# Patient Record
(demographics unavailable — no encounter records)

---

## 2024-10-14 NOTE — HISTORY OF PRESENT ILLNESS
[FreeTextEntry1] : Initial hx 10/2021 5/15/2021 - 2nd dose of vaccine Mid 6/2021 - numbness/tingling in legs, attributed to BP meds. 8/9/2021 - LLE limp, progressed over days. saw neurologist, went to ED at [facility]. by 8/19, paraplegia and loss of BB function. Got IVMP, IVIG, then 7d PLEX because was worsening. Then another round of IVMP. Got 1g rituximab in 9/1/21. Has been in [place] for 1m in 9/2021. Got PE, DVT, sepsis. Went on eliquis. Saw hematologist, seeing heme again on 10/14. Got home last week. Got 1g rituximab in 9/1/21. Still no movement in both legs - some twitching and electrical sensations. Saw rheum, tested "positive for lupus". Got another 1g of rituximab in 11/23/2021 Rituximab dose May/2022 and 11/2022. 10/2023 - spiking fevers, found to have covid and then a UTI. went to ED and was not admitted. 2wks later, felt normal.    Subj interval:  Thinking about moving to [facility] next year.  Having issues obtaining catheters; needs more documentation.   Has been working out his upper body.  Still with intermittent body tightness, still with spasms that can cause pain 1x/wk, currently takes baclofen 30tid and an extra 30mg prn at night which has been helpful. Trileptal 150 bid didn't help at all.  Occ painful paresthesias, but not common. Stopped gabapentin 300bid and didn't notice a difference.  Continues PT twice/week which he thinks is helps.  Bowel is "pretty good", occ accidents.  Urinary leakage is occasional. Mostly straight cathing 4-6x/day  Sleep is a bit better but some nights interrupted by spasms or being sweaty. Marijuana gummies that he makes himself help with sleep.   Mood fluctuates, occasional fatigue throughout the day. Seeing a therapist.    PMHX: - HTN - MS - SLE? 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 Recommended by Dr. Steele to follow up at [facility] to retest and discuss folic acid.  MEDS: eliquis rituximab miralax senna tylenol prn baclofen 30qid grows his own marijuana which helps. solifenacin - still with leakage but better than with oxybutinin.  SHx: used to smoke until hospitalization, occ etoh, no drugs. was an .   O:  AO3. Normally conversant. Follows commands, names, and repeats. Good attention.  PERRL, no APD, no papilledema or pallor, VFF, EOMI, no nystagmus, face symmetric, TUP at midline.  Motor: R: L: Del 5 5 Bi 5 5 Tri 5 5 Wrist Extensors 5 5 Finger abductors 5 5  5 5  no movement in legs  Tone R L UE 0 0 LE 0 0 tone in both legs flaccid  Sensory mod to sev VBS loss absent PP, temp.  Reflexes: hyporeflexic in both legs, normal in UEs.  Coordination: R L FTN 0 0 TELLY 0 0  Other  Gait: wheelchair  Assistance: wheelchair   ESR 88, 106 VIVIANE 1:640 dsdna neg anca neg aqp4 neg mog neg  CSF 8/2021 WBC 23, lymph predom protein 121 OCBs + igg synth very high, igg index high quant gold neg aqp4 csf neg   MRI brain 8/2021 - several brain lesions, PV, typical appearance for MS  MRI C+T spine 8/2021, 9/2021, 10/2021 - myelitis w/ patchy enhancement, initially thoracic, progressed longitudinally to involve low cervical to low thoracic cord, transverse.  MRI brain, C, and T 1/2022 - a few lesions on brain MRI that have an appearance typical of MS. cord notable for development of dorsal column signal change rostral to thoracic lesion, and corticospinal t2h caudal to lesion, consistent with wallerian degeneration. There is no abnormal enhancement.  MRI brain, C, and T 9/2024 - stable lesions on my review compared with 2022 in [place].   AP: 37yo w/ myelitis in 8/2021. Brain lesions and +OCBs certainly increase suspicion for MS, which the patient likely has. However, the extent and severity of the myelitis was highly atypical for MS, initially s/p 2 rounds of IVMP, PLEX, and 1g of rituximab. continues on rituximab.  all questions answered, education provided, management discussed at length.  - cont rituximab 1g q6m x1 dose each (at specialty infusion). HE IS ON INDEFINIE RITUXIMAB TREATMENT.  - check blood work q6m - cont outpatient PT - cont baclofen to 30-30-30 as tolerated for spasms and an extra 30mg prn - he takes that sometimes at night if he is up. - trial of pregabalin (for auth: failed gabapentin, chronic neuropathic pain due to spinal cord injury) - pt prefers to use recreational marijuana instead of medical - f/u with hematologist given PE, on eliquis - cont urology f/u (Dr. Steele) - cont exercises - cont therapy - RTC 6m

## 2024-11-27 NOTE — ASSESSMENT
[FreeTextEntry1] : Mr. Viramontes is a 36 yo WM who presented numbness and then weakness in Lower extremities in July 2021. he was sent to the ER and admitted to [facility], and work up revealed Multiple Sclerosis. While in the hospital he had UTI, He received one dose of Rituxan, He was eventually discharged to [facility], a few days after being admitted to Rehab he developed right sided pleuritic chest pain and swelling, and was found to have a RLL segmental and subsegmental PE and RLE DVT. He was started on Eliquis. 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 Rest of work up, including Anticardiolipin antibodies, B 2 glycoproteins Lupus anticoagulant, Protein C fibrinogen, homocysteine, Factor 5 leiden, Prothrombin gene mutation, were normal. Protein S 131% on 1/4/23.  Elevated H/H 16.7/ 52.3 on 5/8/23. 11/30/23 CBC: WBC 7.11, Hg 17.5, HCT 52.4, plt 265  6/11/24 JAK2, MPL, CALR mutations negative Fe 65, TIBC 331, Sat 20, Ferritin 120, B12 417, FA 13.0 WBC 5.01, HGB 17.0, HCT 51.4, , ANC 3.29  #Polycythemia -Pt smokes 0.5 ppd, sleep study was negative -Possibly related to smoking -CBC CMP ordered today  # H/o PE/ DVT Patient requested to come off Eliquis currently on 2.5 bid eliquis HE is paralyzed in Wheelchair and no sensation in legs, After discussion with patient, on risks benefits , pt is agreeable to be kept on Eliquis 2.5 bid for now Patient planning to move to [place] mid 2025, Discussed establishing care with Providers in [place] when he gets there   F/U in 4-5 months w/ Joanna

## 2024-11-27 NOTE — ASSESSMENT
[FreeTextEntry1] : Mr. Viramontes is a 36 yo WM who presented numbness and then weakness in Lower extremities in July 2021. he was sent to the ER and admitted to [facility], and work up revealed Multiple Sclerosis. While in the hospital he had UTI, He received one dose of Rituxan, He was eventually discharged to [facility], a few days after being admitted to Rehab he developed right sided pleuritic chest pain and swelling, and was found to have a RLL segmental and subsegmental PE and RLE DVT. He was started on Eliquis. Work up done, while in the hospital revealed a protein S activity level of 48, D- Dimer of 449, Homozygote for MTHFR [id number]. Rest of work up, including Anticardiolipin antibodies, B 2 glycoproteins Lupus anticoagulant, Protein C fibrinogen, homocysteine, Factor 5 leiden, Prothrombin gene mutation, were normal. Protein S 131% on 1/4/23.  Elevated H/H 16.7/ 52.3 on 5/8/23. 11/30/23 CBC: WBC 7.11, Hg 17.5, HCT 52.4, plt 265  6/11/24 JAK2, MPL, CALR mutations negative Fe 65, TIBC 331, Sat 20, Ferritin 120, B12 417, FA 13.0 WBC 5.01, HGB 17.0, HCT 51.4, , ANC 3.29  #Polycythemia -Pt smokes 0.5 ppd, sleep study was negative -Possibly related to smoking -CBC CMP ordered today  # H/o PE/ DVT Patient requested to come off Eliquis currently on 2.5 bid eliquis HE is paralyzed in Wheelchair and no sensation in legs, After discussion with patient, on risks benefits , pt is agreeable to be kept on Eliquis 2.5 bid for now Patient planning to move to [place] mid 2025, Discussed establishing care with Providers in [place] when he gets there   F/U in 4-5 months w/ Joanna

## 2024-11-27 NOTE — PHYSICAL EXAM
[Capable of only limited self care, confined to bed or chair more than 50% of waking hours] : Status 3- Capable of only limited self care, confined to bed or chair more than 50% of waking hours [Normal] : affect appropriate [de-identified] : paralysis below waist

## 2024-11-27 NOTE — BEGINNING OF VISIT
[PHQ-2 Negative] : PHQ-2 Negative [PHQ-9 Deferred] : PHQ-9 Deferred [Advised Primary Care Follow-up] : Advised Primary Care Follow-up  [Current] : Current [Patient advised of risk of tobacco use and smoking cessation discussed.] : Patient advised of risk of tobacco use and smoking cessation discussed. [Date Discussed (MM/DD/YY): ___] : Discussed: [unfilled] [With Patient/Caregiver] : with Patient/Caregiver

## 2024-11-27 NOTE — HISTORY OF PRESENT ILLNESS
[de-identified] : \par  Mr. Viramontes is a 37 yo WM who presented numbness and then weakness in Lower extremities in July 2021. he was sent to the Er and admitted to [facility], and work up revealed Multiple Sclerosis. While in the hospital he had UTI, He received one dose of Rituxan, He was eventually discharged to [facility], a few days after being admitted to Rehab he developed right sided pleuritic chest pain and swelling, and was found to have a RLL segmental and subsegmental PE and RLE DVT. He was started on Eliquis. Work up done , while in the hospital revealed a protein S activity level of 48, , D- Dimer of 449, Homozygote for MTHFR [id number]. Rest of work up , including Anticardiolipin antibodies, B 2 glycoproteins Lupus anticoagulant, Protein C fibrinogen , homocysteine, Factor 5 leiden, Prothrombin gene mutation , were normal [de-identified] : Presents for follow up with spouse. Transferring care from Dr. Domínguez. Ambulates with wheelchair.   Continues on Eliquis 2.5 mg BID, tolerating well, no bleeding or bruising.  Continues on Rituxan 6 months, but no improvement in weakness secondary to MS. + intermittent snoring per spouse + moderate sleep, feels energized some of the time upon waking up Pending botox to bladder, following with uro, will pause PPX Eliquis 48 hrs before and after procedure  Was in PT, however stopped due to lack of coverage by insurance company.  Denies h/o sleep apnea 5/8/23 CBC: WBC 4.8 K, HGB 16.7 g, HCT 52.3 %,  K, ANC 2.6  12/6/23: Patient presents for follow up  Patient compliant with Eliquis 2.5 mg Patient had sleeping test performed, he does not have ARAM Denies bruising, epistaxis, hematuria  11/30/23 CBC: WBC 7.11, Hg 17.5, HCT 52.4, plt 265  6/5/24: Pt presents for follow up Pt reports no acute changes Currently receives Rituximab once every 6 months Currently still not able to get up, uses wheelchair Denies bleeding or bruising Denies being on testosterone, had sleep study recently Pt reports that he smokes around 0.5 ppd Used to donate blood regularly, around once per year before being diagnosed with MS Denies FMHx of hemochromatosis  11/27/24: Patient presents for follow up visit Continues of Eliquis 2.5 mg BID Follows with [facility] routinely, no acute changes, gets Rituximab, next at end of Dec 2024 Planning on moving mid 2025 to [place]  6/11/24 JAK2, MPL, CALR mutations negative Fe 65, TIBC 331, Sat 20, Ferritin 120, B12 417, FA 13.0 WBC 5.01, HGB 17.0, HCT 51.4, , ANC 3.29

## 2024-11-27 NOTE — PHYSICAL EXAM
[Capable of only limited self care, confined to bed or chair more than 50% of waking hours] : Status 3- Capable of only limited self care, confined to bed or chair more than 50% of waking hours [Normal] : affect appropriate [de-identified] : paralysis below waist

## 2024-11-27 NOTE — ADDENDUM
[FreeTextEntry1] :  Documented by Selma Ocampo acting as scribe for Dr. Cota on  11/27/2024.   All Medical record entries made by the Scribe were at my, Dr. Cota's, direction and personally dictated by me on  11/27/2024. I have reviewed the chart and agree that the record accurately reflects my personal performance of the history, physical exam, assessment and plan. I have also personally directed, reviewed, and agreed with the discharge instructions.

## 2025-01-13 NOTE — REVIEW OF SYSTEMS
[Joint Pain] : no joint pain [Negative] : Psychiatric [FreeTextEntry7] : Well established routine, no accidents, very rare small amount of blood.  [FreeTextEntry8] : see HPI. Last sono 3/24 [de-identified] : see HPI [de-identified] : see HPI [de-identified] : Evaluated for polycythemia with no findings including absence of sleep apnea. Felt most likely related to smoking.

## 2025-01-13 NOTE — PHYSICAL EXAM
[Normal] : Oriented to person, place, and time, insight and judgement were intact and the affect was normal [de-identified] : post to left greater troch, stage I pressure ulcer.  [de-identified] : Stable paraplegia with mild-mod increased tone in legs.

## 2025-01-13 NOTE — ASSESSMENT
[FreeTextEntry1] : 38 man with MS and resulting T6 paraplegia.  Rec: 1. UA, C&S. Sent script for Levaquin which he can begin if urinary symptoms worsen. 2. Renal and bladder sono. 3. Reeval of wc position by OT. Script sent and therapist notified.  4. Twice daily skin inspection, position changes when in commode.  5. Follow up  3ms.

## 2025-01-13 NOTE — HISTORY OF PRESENT ILLNESS
[FreeTextEntry1] : 38 man with T6 paraplegia from MS. Maintained on rituxan. Last MRIs did not show any progression of lesions. Clinically no deterioration either. Was switched from gabapentin to pregabalin by Dr. Mancuso and seems to be helping after first 10 days. Notes recurrence of UTIs characterized by voiding between caths and either blood in urine or cloudy urine. Last treated with antibiotic about a month ago. Now symptomatic again despite anticholinergic. No deterioration in functional status. Concerned about a new area of redness on skin.

## 2025-03-20 NOTE — PHYSICAL EXAM
[Wheelchair] : uses a wheelchair [de-identified] : CONSTITUTIONAL:  Patient is a very pleasant individual who is well-nourished and appears stated age.  PSYCHIATRIC:  Alert and oriented times three and in no apparent distress, and participates with orthopedic evaluation well. HEAD:  Atraumatic and  nonsyndromic in appearance. EENT: No thyromegaly, EOMI. RESPIRATORY:  Respiratory rate is regular, not dyspneic on examination. LYMPHATICS:  There is no cervical or axillary lymphadenopathy. INTEGUMENTARY:  Skin is clean, dry, and intact about the bilateral upper extremities and cervical spine.  Right mid axillary line may be slightly posterior in the LAT region there is a large subcuticular palpable mass mobile.  Small amount of erythema superficial and a small amount of skin breakdown is present. VASCULAR:   There is brisk capillary refill about the bilateral upper extremities and radial pulses are 2/4.  NEUROLOGIC:  Negative L'hirmitte, negative Spurling's sign. There are no pathologic reflexes. There is decrease in sensation of the bilateral upper extremities on manual examination.  Deep tendon reflexes are well-maintained at +0/4 of the bilateral upper extremities and are symmetric. MUSCULOSKELETAL:  There is visible muscular atrophy  lower extremities.  Manual motor strength is well maintained in the bilateral upper extremities.  Cervical range of motion is well maintained.  The patient ambulates in a non-myelopathic manner. Normal secondary orthopaedic exam of bilateral shoulders, elbows and hands.  Elbow flexion and extension, wrist extension, finger flexion and abduction are well maintained.    [de-identified] : CAT scan of the chest has been reviewed from [facility] is demonstrating this right chest wall subcuticular fluid collection

## 2025-03-20 NOTE — DISCUSSION/SUMMARY
[de-identified] : Very pleasant gentleman presents for this subcuticular mass evaluation of the right chest wall mid axillary LAT region.  He will be started on a broad-based simple antibiosis secondary to the superficial erythema MRI with and without contrast has been ordered to evaluate for subcuticular fluid collections air-fluid levels excetra.  Help this may help determine hematoma versus infection excetra.  Patient will follow-up immediately after MRI is complete.  Concerning presentation because of his underlying paraplegia and is insensate thoracic dermatome level this may be an underlying infection which is overall direct threat to bodily function hence MRI chest with and without contrast broad-spectrum antibiosis.  Follow-up immediately after MRI is complete

## 2025-03-20 NOTE — HISTORY OF PRESENT ILLNESS
[Worsening] : worsening [___ wks] : [unfilled] week(s) ago [0] : a maximum pain level of 0/10 [Constant] : ~He/She~ states the symptoms seem to be constant [Ataxia] : ataxia [de-identified] : Very pleasant gentleman with a unfortunate history of transverse myelitis and resulting thoracic paraplegia presents with a rather rapidly enlarging right mid axillary line may be slightly midaxillary line subcuticular chest mass.  He was in the ER on Friday he states that since that time it has been progressively getting bigger he denies any fevers chills excetra.  Referred here for follow-up. No complaints of pain may be misleading because of his underlying transverse myelitis and insensate thoracic dermatomes [Incontinence] : no incontinence [Loss of Dexterity] : good dexterity [Urinary Ret.] : no urinary retention

## 2025-04-01 NOTE — HISTORY OF PRESENT ILLNESS
[de-identified] : \par  Mr. Viramontes is a 35 yo WM who presented numbness and then weakness in Lower extremities in July 2021. he was sent to the Er and admitted to [facility], and work up revealed Multiple Sclerosis. While in the hospital he had UTI, He received one dose of Rituxan, He was eventually discharged to [facility], a few days after being admitted to Rehab he developed right sided pleuritic chest pain and swelling, and was found to have a RLL segmental and subsegmental PE and RLE DVT. He was started on Eliquis. Work up done , while in the hospital revealed a protein S activity level of 48, , D- Dimer of 449, Homozygote for MTHFR [id number]. Rest of work up , including Anticardiolipin antibodies, B 2 glycoproteins Lupus anticoagulant, Protein C fibrinogen , homocysteine, Factor 5 leiden, Prothrombin gene mutation , were normal [de-identified] : Presents for follow up with spouse. Transferring care from Dr. Domínguez. Ambulates with wheelchair.   Continues on Eliquis 2.5 mg BID, tolerating well, no bleeding or bruising.  Continues on Rituxan 6 months, but no improvement in weakness secondary to MS. + intermittent snoring per spouse + moderate sleep, feels energized some of the time upon waking up Pending botox to bladder, following with uro, will pause PPX Eliquis 48 hrs before and after procedure  Was in PT, however stopped due to lack of coverage by insurance company.  Denies h/o sleep apnea 5/8/23 CBC: WBC 4.8 K, HGB 16.7 g, HCT 52.3 %,  K, ANC 2.6  12/6/23: Patient presents for follow up  Patient compliant with Eliquis 2.5 mg Patient had sleeping test performed, he does not have ARAM Denies bruising, epistaxis, hematuria  11/30/23 CBC: WBC 7.11, Hg 17.5, HCT 52.4, plt 265  6/5/24: Pt presents for follow up Pt reports no acute changes Currently receives Rituximab once every 6 months Currently still not able to get up, uses wheelchair Denies bleeding or bruising Denies being on testosterone, had sleep study recently Pt reports that he smokes around 0.5 ppd Used to donate blood regularly, around once per year before being diagnosed with MS Denies FMHx of hemochromatosis  11/27/24: Patient presents for follow up visit Continues of Eliquis 2.5 mg BID Follows with [facility] routinely, no acute changes, gets Rituximab, next at end of Dec 2024 Planning on moving mid 2025 to [place]  6/11/24 JAK2, MPL, CALR mutations negative Fe 65, TIBC 331, Sat 20, Ferritin 120, B12 417, FA 13.0 WBC 5.01, HGB 17.0, HCT 51.4, , ANC 3.29  3/31/2025: Patient presents for follow up accompanied by wife Continues Eliquis 2.5 mg BID , compliant Patient seen in ED on 3/26/35 for hematoma on right flank. I&D performed, currently on Keflex They are concerned for possible redness around incision Denies purulent discharge and fever  Today's CBC: WBC 6.65, Hg 15.9 , HCT 48.1 , plt 279

## 2025-04-01 NOTE — PHYSICAL EXAM
[Capable of only limited self care, confined to bed or chair more than 50% of waking hours] : Status 3- Capable of only limited self care, confined to bed or chair more than 50% of waking hours [Normal] : affect appropriate [de-identified] : paralysis below waist

## 2025-04-01 NOTE — ASSESSMENT
[FreeTextEntry1] : Mr. Viramontes is a 36 yo WM who presented numbness and then weakness in Lower extremities in July 2021. he was sent to the ER and admitted to [facility], and work up revealed Multiple Sclerosis. While in the hospital he had UTI, He received one dose of Rituxan, He was eventually discharged to [facility], a few days after being admitted to Rehab he developed right sided pleuritic chest pain and swelling, and was found to have a RLL segmental and subsegmental PE and RLE DVT. He was started on Eliquis. Work up done, while in the hospital revealed a protein S activity level of 48, D- Dimer of 449, Homozygote for MTHFR [id number]. Rest of work up, including Anticardiolipin antibodies, B 2 glycoproteins Lupus anticoagulant, Protein C fibrinogen, homocysteine, Factor 5 leiden, Prothrombin gene mutation, were normal. Protein S 131% on 1/4/23.  Elevated H/H 16.7/ 52.3 on 5/8/23. 11/30/23 CBC: WBC 7.11, Hg 17.5, HCT 52.4, plt 265  6/11/24 JAK2, MPL, CALR mutations negative Fe 65, TIBC 331, Sat 20, Ferritin 120, B12 417, FA 13.0 WBC 5.01, HGB 17.0, HCT 51.4, , ANC 3.29  #Polycythemia -Pt smokes 0.5 ppd, sleep study was negative -Possibly related to smoking -CBC CMP ordered today  # H/o PE/ DVT Patient requested to come off Eliquis currently on 2.5 bid eliquis HE is paralyzed in Wheelchair and no sensation in legs, After discussion with patient, on risks benefits , pt is agreeable to be kept on Eliquis 2.5 bid for now Patient planning to move to [place] mid 2025, Discussed establishing care with Providers in [place] when he gets there   Advised to monitor incision for any redness and discharge. Will send additional 3 days of abx to avoid infection Patient to f/u with surgeon 4/3/25   F/U in 4-5 months

## 2025-04-01 NOTE — HISTORY OF PRESENT ILLNESS
[de-identified] : \par  Mr. Viramontes is a 35 yo WM who presented numbness and then weakness in Lower extremities in July 2021. he was sent to the Er and admitted to [facility], and work up revealed Multiple Sclerosis. While in the hospital he had UTI, He received one dose of Rituxan, He was eventually discharged to [facility], a few days after being admitted to Rehab he developed right sided pleuritic chest pain and swelling, and was found to have a RLL segmental and subsegmental PE and RLE DVT. He was started on Eliquis. 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 Rest of work up , including Anticardiolipin antibodies, B 2 glycoproteins Lupus anticoagulant, Protein C fibrinogen , homocysteine, Factor 5 leiden, Prothrombin gene mutation , were normal [de-identified] : Presents for follow up with spouse. Transferring care from Dr. Domínguez. Ambulates with wheelchair.   Continues on Eliquis 2.5 mg BID, tolerating well, no bleeding or bruising.  Continues on Rituxan 6 months, but no improvement in weakness secondary to MS. + intermittent snoring per spouse + moderate sleep, feels energized some of the time upon waking up Pending botox to bladder, following with uro, will pause PPX Eliquis 48 hrs before and after procedure  Was in PT, however stopped due to lack of coverage by insurance company.  Denies h/o sleep apnea 5/8/23 CBC: WBC 4.8 K, HGB 16.7 g, HCT 52.3 %,  K, ANC 2.6  12/6/23: Patient presents for follow up  Patient compliant with Eliquis 2.5 mg Patient had sleeping test performed, he does not have ARAM Denies bruising, epistaxis, hematuria  11/30/23 CBC: WBC 7.11, Hg 17.5, HCT 52.4, plt 265  6/5/24: Pt presents for follow up Pt reports no acute changes Currently receives Rituximab once every 6 months Currently still not able to get up, uses wheelchair Denies bleeding or bruising Denies being on testosterone, had sleep study recently Pt reports that he smokes around 0.5 ppd Used to donate blood regularly, around once per year before being diagnosed with MS Denies FMHx of hemochromatosis  11/27/24: Patient presents for follow up visit Continues of Eliquis 2.5 mg BID Follows with [facility] routinely, no acute changes, gets Rituximab, next at end of Dec 2024 Planning on moving mid 2025 to [place]  6/11/24 JAK2, MPL, CALR mutations negative Fe 65, TIBC 331, Sat 20, Ferritin 120, B12 417, FA 13.0 WBC 5.01, HGB 17.0, HCT 51.4, , ANC 3.29  3/31/2025: Patient presents for follow up accompanied by wife Continues Eliquis 2.5 mg BID , compliant Patient seen in ED on 3/26/35 for hematoma on right flank. I&D performed, currently on Keflex They are concerned for possible redness around incision Denies purulent discharge and fever  Today's CBC: WBC 6.65, Hg 15.9 , HCT 48.1 , plt 279

## 2025-04-01 NOTE — PHYSICAL EXAM
[Capable of only limited self care, confined to bed or chair more than 50% of waking hours] : Status 3- Capable of only limited self care, confined to bed or chair more than 50% of waking hours [Normal] : affect appropriate [de-identified] : paralysis below waist

## 2025-04-01 NOTE — PHYSICAL EXAM
[Capable of only limited self care, confined to bed or chair more than 50% of waking hours] : Status 3- Capable of only limited self care, confined to bed or chair more than 50% of waking hours [Normal] : affect appropriate [de-identified] : paralysis below waist

## 2025-04-01 NOTE — HISTORY OF PRESENT ILLNESS
[de-identified] : \par  Mr. Viramontes is a 37 yo WM who presented numbness and then weakness in Lower extremities in July 2021. he was sent to the Er and admitted to [facility], and work up revealed Multiple Sclerosis. While in the hospital he had UTI, He received one dose of Rituxan, He was eventually discharged to [facility], a few days after being admitted to Rehab he developed right sided pleuritic chest pain and swelling, and was found to have a RLL segmental and subsegmental PE and RLE DVT. He was started on Eliquis. Work up done , while in the hospital revealed a protein S activity level of 48, , D- Dimer of 449, Homozygote for MTHFR [id number]. Rest of work up , including Anticardiolipin antibodies, B 2 glycoproteins Lupus anticoagulant, Protein C fibrinogen , homocysteine, Factor 5 leiden, Prothrombin gene mutation , were normal [de-identified] : Presents for follow up with spouse. Transferring care from Dr. Domínguez. Ambulates with wheelchair.   Continues on Eliquis 2.5 mg BID, tolerating well, no bleeding or bruising.  Continues on Rituxan 6 months, but no improvement in weakness secondary to MS. + intermittent snoring per spouse + moderate sleep, feels energized some of the time upon waking up Pending botox to bladder, following with uro, will pause PPX Eliquis 48 hrs before and after procedure  Was in PT, however stopped due to lack of coverage by insurance company.  Denies h/o sleep apnea 5/8/23 CBC: WBC 4.8 K, HGB 16.7 g, HCT 52.3 %,  K, ANC 2.6  12/6/23: Patient presents for follow up  Patient compliant with Eliquis 2.5 mg Patient had sleeping test performed, he does not have ARAM Denies bruising, epistaxis, hematuria  11/30/23 CBC: WBC 7.11, Hg 17.5, HCT 52.4, plt 265  6/5/24: Pt presents for follow up Pt reports no acute changes Currently receives Rituximab once every 6 months Currently still not able to get up, uses wheelchair Denies bleeding or bruising Denies being on testosterone, had sleep study recently Pt reports that he smokes around 0.5 ppd Used to donate blood regularly, around once per year before being diagnosed with MS Denies FMHx of hemochromatosis  11/27/24: Patient presents for follow up visit Continues of Eliquis 2.5 mg BID Follows with [facility] routinely, no acute changes, gets Rituximab, next at end of Dec 2024 Planning on moving mid 2025 to [place]  6/11/24 JAK2, MPL, CALR mutations negative Fe 65, TIBC 331, Sat 20, Ferritin 120, B12 417, FA 13.0 WBC 5.01, HGB 17.0, HCT 51.4, , ANC 3.29  3/31/2025: Patient presents for follow up accompanied by wife Continues Eliquis 2.5 mg BID , compliant Patient seen in ED on 3/26/35 for hematoma on right flank. I&D performed, currently on Keflex They are concerned for possible redness around incision Denies purulent discharge and fever  Today's CBC: WBC 6.65, Hg 15.9 , HCT 48.1 , plt 279

## 2025-04-01 NOTE — ASSESSMENT
[FreeTextEntry1] : Mr. Viramontes is a 38 yo WM who presented numbness and then weakness in Lower extremities in July 2021. he was sent to the ER and admitted to [facility], and work up revealed Multiple Sclerosis. While in the hospital he had UTI, He received one dose of Rituxan, He was eventually discharged to [facility], a few days after being admitted to Rehab he developed right sided pleuritic chest pain and swelling, and was found to have a RLL segmental and subsegmental PE and RLE DVT. He was started on Eliquis. Work up done, while in the hospital revealed a protein S activity level of 48, D- Dimer of 449, Homozygote for MTHFR [id number]. Rest of work up, including Anticardiolipin antibodies, B 2 glycoproteins Lupus anticoagulant, Protein C fibrinogen, homocysteine, Factor 5 leiden, Prothrombin gene mutation, were normal. Protein S 131% on 1/4/23.  Elevated H/H 16.7/ 52.3 on 5/8/23. 11/30/23 CBC: WBC 7.11, Hg 17.5, HCT 52.4, plt 265  6/11/24 JAK2, MPL, CALR mutations negative Fe 65, TIBC 331, Sat 20, Ferritin 120, B12 417, FA 13.0 WBC 5.01, HGB 17.0, HCT 51.4, , ANC 3.29  #Polycythemia -Pt smokes 0.5 ppd, sleep study was negative -Possibly related to smoking -CBC CMP ordered today  # H/o PE/ DVT Patient requested to come off Eliquis currently on 2.5 bid eliquis HE is paralyzed in Wheelchair and no sensation in legs, After discussion with patient, on risks benefits , pt is agreeable to be kept on Eliquis 2.5 bid for now Patient planning to move to [place] mid 2025, Discussed establishing care with Providers in [place] when he gets there   Advised to monitor incision for any redness and discharge. Will send additional 3 days of abx to avoid infection Patient to f/u with surgeon 4/3/25   F/U in 4-5 months

## 2025-04-01 NOTE — ASSESSMENT
[FreeTextEntry1] : Mr. Viramontes is a 38 yo WM who presented numbness and then weakness in Lower extremities in July 2021. he was sent to the ER and admitted to [facility], and work up revealed Multiple Sclerosis. While in the hospital he had UTI, He received one dose of Rituxan, He was eventually discharged to [facility], a few days after being admitted to Rehab he developed right sided pleuritic chest pain and swelling, and was found to have a RLL segmental and subsegmental PE and RLE DVT. He was started on Eliquis. Work up done, while in the hospital revealed a protein S activity level of 48, D- Dimer of 449, Homozygote for MTHFR [id number]. Rest of work up, including Anticardiolipin antibodies, B 2 glycoproteins Lupus anticoagulant, Protein C fibrinogen, homocysteine, Factor 5 leiden, Prothrombin gene mutation, were normal. Protein S 131% on 1/4/23.  Elevated H/H 16.7/ 52.3 on 5/8/23. 11/30/23 CBC: WBC 7.11, Hg 17.5, HCT 52.4, plt 265  6/11/24 JAK2, MPL, CALR mutations negative Fe 65, TIBC 331, Sat 20, Ferritin 120, B12 417, FA 13.0 WBC 5.01, HGB 17.0, HCT 51.4, , ANC 3.29  #Polycythemia -Pt smokes 0.5 ppd, sleep study was negative -Possibly related to smoking -CBC CMP ordered today  # H/o PE/ DVT Patient requested to come off Eliquis currently on 2.5 bid eliquis HE is paralyzed in Wheelchair and no sensation in legs, After discussion with patient, on risks benefits , pt is agreeable to be kept on Eliquis 2.5 bid for now Patient planning to move to [place] mid 2025, Discussed establishing care with Providers in [place] when he gets there   Advised to monitor incision for any redness and discharge. Will send additional 3 days of abx to avoid infection Patient to f/u with surgeon 4/3/25   F/U in 4-5 months

## 2025-04-15 NOTE — HISTORY OF PRESENT ILLNESS
[FreeTextEntry1] : "Sixto" 38M w/ PMH MS (on rituximab) c/b T6 spastic paraplegia, DVT (on apixaban), polycythemia, and tobacco use, presenting for f/u.  Skin: S/p I&D of chest wall hematoma by Dr. Stephenson (vascular) that presented as a prominence . This appeared 2 days after wc seating altered to account for weight loss. Followed up 04/10. S/p cephalexin x14d, complete 04/11. Wound remains open, not substantially closing w/ some blackness to rim. + odor of "chop meat" for past week. To f/u again 04/17.  Left greater troch lump now smaller. Redness resolved. Has stopped sleeping on left side. Has not identified any cause of pressure other than bed position. No active management. Spasticity/pain: Waxes/wanes; intermittent leg spasms. Pregabalin 50mg TID and baclofen 20/30/40mg scheduled + 20mg hs PRN as per neuro. Stretches daily. Not actively. No bracing.   Equip: Fitted for new wheelchair early 03/2025. Awaiting delivery.  Coag: Continues apixaban 2.5mg BID per hem/onc. Bladder: Solifenacin 10mg daily. Hiprex 1g BID w/o vitC. UTI 01/2025 cleared s/p levofloxacin. Straight cath 5-10x daily. Pad during day, condom cath hs. Sometimes has left leg twitching w/ bladder volume 400-500mL, otherwise can palpate.  Bowel: Recently changed to QOD am bowel routine. Suppository w/ digital stim PRN. Miralax 1/4 dose on off day + dose night before routine. Happy with change.  Sex: No interpersonal intimacy at this time. Gets physiologic erections. No psychogenic erections. Would be interested in exploring self-intimacy in the future. Social: [place] that apt in [place] has elevator that is not wheelchair accessible. Wife's family w/ significant events. Wife possibly interested in separation, desiring to move to [place]. Requesting home care services. Psych: Limited sleep lately. Multiple stressors as above.

## 2025-04-15 NOTE — PHYSICAL EXAM
[FreeTextEntry1] : Gen: no acute distress Head: normocephalic Eyes: anicteric Resp: normal effort on room air Chest: surgical incision to right inferolateral chest wall w/ residual full-thickness opening and diffuse surrounding necrotic tissue.  Left hip: no erythema, palpable small mobile nodule to posteroinferior greater troch Neuro: alert, appropriately oriented, significant b/l ankle clonus. Paraplegia.     Tone: MAS 3 throughout (b/l hip adductors, knee extensors, knee flexors, plantarflexors) Psych: appropriate mood/affect

## 2025-04-15 NOTE — ASSESSMENT
[FreeTextEntry1] : 38M w/ PMH MS (on rituximab) c/b T6 spastic paraplegia, DVT (on apixaban), polycythemia, and tobacco use, presenting for f/u. Discussed concern that right chest wall wound has been filled w/ nonviable tissue that may require further intervention as per vascular. No signs of soft tissue infection at this time.  - f/u 04/17 w/ vascular to review right chest wall wound; pt to email PM&R 04/18 w/ vascular recs. Expect will need debridement =/-flap.  - awaiting wheelchair delivery. Discussed events with w/c specialist given impression that hematoma may have been caused by upper lateral portion of back rest.  - continue baclofen + pregabalin as per neuro - continue apixaban as per hem/onc - continue solifenacin + methenamine - can consider trial of vitC 500u BID; discussed no supportive data available at this time, but safe and reasonable d/t recurrent UTIs on methenamine - rec to change Miralax to QOD nightly before am digital stim routine - plan to further discuss sexuality/intimacy in future - monitor tobacco use - monitor psychosocial stressors; consider referral back to neuropsych PRN - refer to social work for establishment of home health services

## 2025-04-15 NOTE — END OF VISIT
[] : Resident [FreeTextEntry3] : I was physically present for the key portion of the history and physical exam and I directed medical management as documented by Dr. TYLER Tapia.

## 2025-04-15 NOTE — HISTORY OF PRESENT ILLNESS
[FreeTextEntry1] : "Sixto" 38M w/ PMH MS (on rituximab) c/b T6 spastic paraplegia, DVT (on apixaban), polycythemia, and tobacco use, presenting for f/u.  Skin: S/p I&D of chest wall hematoma by Dr. Stephenson (vascular) that presented as a prominence . This appeared 2 days after wc seating altered to account for weight loss. Followed up 04/10. S/p cephalexin x14d, complete 04/11. Wound remains open, not substantially closing w/ some blackness to rim. + odor of "chop meat" for past week. To f/u again 04/17.  Left greater troch lump now smaller. Redness resolved. Has stopped sleeping on left side. Has not identified any cause of pressure other than bed position. No active management. Spasticity/pain: Waxes/wanes; intermittent leg spasms. Pregabalin 50mg TID and baclofen 20/30/40mg scheduled + 20mg hs PRN as per neuro. Stretches daily. Not actively. No bracing.   Equip: Fitted for new wheelchair early 03/2025. Awaiting delivery.  Coag: Continues apixaban 2.5mg BID per hem/onc. Bladder: Solifenacin 10mg daily. Hiprex 1g BID w/o vitC. UTI 01/2025 cleared s/p levofloxacin. Straight cath 5-10x daily. Pad during day, condom cath hs. Sometimes has left leg twitching w/ bladder volume 400-500mL, otherwise can palpate.  Bowel: Recently changed to QOD am bowel routine. Suppository w/ digital stim PRN. Miralax 1/4 dose on off day + dose night before routine. Happy with change.  Sex: No interpersonal intimacy at this time. Gets physiologic erections. No psychogenic erections. Would be interested in exploring self-intimacy in the future. Social: [place] that apt in [place] has elevator that is not wheelchair accessible. Wife's family w/ significant events. Wife possibly interested in separation, desiring to move to [place]. Requesting home care services. Psych: Limited sleep lately. Multiple stressors as above.

## 2025-04-15 NOTE — REVIEW OF SYSTEMS
[Joint Pain] : no joint pain [Muscle Pain] : no muscle pain [Negative] : Respiratory [FreeTextEntry2] : Lost 60 lbs (desired)  [FreeTextEntry7] : see HPI [FreeTextEntry8] : see HPI [de-identified] : see HPI [de-identified] : see HPI [de-identified] : stressed [de-identified] : see HPI

## 2025-04-15 NOTE — REVIEW OF SYSTEMS
[Joint Pain] : no joint pain [Muscle Pain] : no muscle pain [Negative] : Respiratory [FreeTextEntry2] : Lost 60 lbs (desired)  [FreeTextEntry7] : see HPI [FreeTextEntry8] : see HPI [de-identified] : see HPI [de-identified] : see HPI [de-identified] : stressed [de-identified] : see HPI

## 2025-04-28 NOTE — HISTORY OF PRESENT ILLNESS
[FreeTextEntry1] : Initial hx 10/2021 5/15/2021 - 2nd dose of vaccine Mid 6/2021 - numbness/tingling in legs, attributed to BP meds. 8/9/2021 - LLE limp, progressed over days. saw neurologist, went to ED at [facility]. by 8/19, paraplegia and loss of BB function. Got IVMP, IVIG, then 7d PLEX because was worsening. Then another round of IVMP. Got 1g rituximab in 9/1/21. Has been in [place] for 1m in 9/2021. Got PE, DVT, sepsis. Went on eliquis. Saw hematologist, seeing heme again on 10/14. Got home last week. Got 1g rituximab in 9/1/21. Still no movement in both legs - some twitching and electrical sensations. Saw rheum, tested "positive for lupus". Got another 1g of rituximab in 11/23/2021 Rituximab dose May/2022 and 11/2022. 10/2023 - spiking fevers, found to have covid and then a UTI. went to ED and was not admitted. 2wks later, felt normal.    Subj interval:  Has a R thoracic wound related to likely R thoracic hematoma without definite e/o infection.   Lost 60lbs intentionally over the last 1 year.   Thinking about moving to [place] but looking for wheelchair accessible apartment.   Having issues obtaining catheters; needs more documentation.   Has been working out his upper body.  Still with intermittent body tightness, still with spasms that can cause pain 1x/wk, currently takes baclofen 30tid and an extra 30mg prn at night which has been helpful. Trileptal 150 bid didn't help at all.  Improved painful paresthesias with pregabalin. Stopped gabapentin 300bid and didn't notice a difference. Pregabalin 50tid helps.   Continues PT twice/week which he thinks is helps.  Bowel is "pretty good", occ accidents.  Urinary leakage is occasional. Mostly straight cathing 4-6x/day  Sleep is a bit better but some nights interrupted by spasms or being sweaty. Marijuana gummies that he makes himself help with sleep.   Mood fluctuates, occasional fatigue throughout the day. Seeing a therapist.    PMHX: - HTN - MS - SLE? 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 Recommended by Dr. Steele to follow up at [facility] to retest and discuss folic acid.  MEDS: eliquis rituximab miralax senna tylenol prn baclofen 30qid grows his own marijuana which helps. solifenacin - still with leakage but better than with oxybutinin. pregabalin 50tid  SHx: used to smoke until hospitalization, occ etoh, no drugs. was an .   O:  AO3. Normally conversant. Follows commands, names, and repeats. Good attention.  PERRL, no APD, no papilledema or pallor, VFF, EOMI, no nystagmus, face symmetric, TUP at midline.  Motor: R: L: Del 5 5 Bi 5 5 Tri 5 5 Wrist Extensors 5 5 Finger abductors 5 5  5 5  no movement in legs  Tone R L UE 0 0 LE 0 0 tone in both legs flaccid  Sensory mod to sev VBS loss absent PP, temp.  Reflexes: hyporeflexic in both legs, normal in UEs.  Coordination: R L FTN 0 0 TELLY 0 0  Other  Gait: wheelchair  Assistance: wheelchair  serum 2021 ESR 88, 106 VIVIANE 1:640 dsdna neg anca neg aqp4 neg ([facility]) mog neg  CSF 8/2021 WBC 23, lymph predom protein 121 OCBs + igg synth very high, igg index high quant gold neg aqp4 csf neg   MRI brain 8/2021 - several brain lesions, PV, typical appearance for MS  MRI C+T spine 8/2021, 9/2021, 10/2021 - myelitis w/ patchy enhancement, initially thoracic, progressed longitudinally to involve low cervical to low thoracic cord, transverse.  MRI brain, C, and T 1/2022 - a few lesions on brain MRI that have an appearance typical of MS. cord notable for development of dorsal column signal change rostral to thoracic lesion, and corticospinal t2h caudal to lesion, consistent with wallerian degeneration. There is no abnormal enhancement.  MRI brain, C, and T 9/2024 - stable lesions on my review compared with 2022 in [facility].   AP: 40yo w/ myelitis in 8/2021. Brain lesions and +OCBs certainly increase suspicion for MS, which the patient likely has. However, the extent and severity of the myelitis was highly atypical for MS, initially s/p 2 rounds of IVMP, PLEX, and 1g of rituximab. continues on rituximab.  all questions answered, education provided, management discussed at length.  - cont rituximab 1g (at specialty infusion) in 7/2025 (can delay by 1m due to hematoma and open thoracic wound). If he needs to delay past 7/2025, he will contact me for immune cell monitoring of b cell counts. HE IS ON INDEFINIE RITUXIMAB TREATMENT.  - check blood work q6m. recheck NMO and MOG with next lab work (negative in csf and serum timothy in 2021) - cont outpatient PT - cont baclofen to 30-30-30 as tolerated for spasms and an extra 30mg prn - he takes that sometimes at night if he is up. - cont pregabalin (for auth: failed gabapentin, chronic neuropathic pain due to spinal cord injury) 50 tid to qid. - pt prefers to use recreational marijuana instead of medical - f/u with hematologist given PE, on eliquis - cont urology f/u (Dr. Steele) - cont exercises - cont therapy - RTC 6m

## 2025-05-06 NOTE — HISTORY OF PRESENT ILLNESS
[de-identified] : \par  Mr. Viramontes is a 35 yo WM who presented numbness and then weakness in Lower extremities in July 2021. he was sent to the Er and admitted to [facility], and work up revealed Multiple Sclerosis. While in the hospital he had UTI, He received one dose of Rituxan, He was eventually discharged to [facility], a few days after being admitted to Rehab he developed right sided pleuritic chest pain and swelling, and was found to have a RLL segmental and subsegmental PE and RLE DVT. He was started on Eliquis. Work up done , while in the hospital revealed a protein S activity level of 48, , D- Dimer of 449, Homozygote for MTHFR [id number]. Rest of work up , including Anticardiolipin antibodies, B 2 glycoproteins Lupus anticoagulant, Protein C fibrinogen , homocysteine, Factor 5 leiden, Prothrombin gene mutation , were normal [de-identified] : Presents for follow up with spouse. Transferring care from Dr. Domínguez. Ambulates with wheelchair.   Continues on Eliquis 2.5 mg BID, tolerating well, no bleeding or bruising.  Continues on Rituxan 6 months, but no improvement in weakness secondary to MS. + intermittent snoring per spouse + moderate sleep, feels energized some of the time upon waking up Pending botox to bladder, following with uro, will pause PPX Eliquis 48 hrs before and after procedure  Was in PT, however stopped due to lack of coverage by insurance company.  Denies h/o sleep apnea 5/8/23 CBC: WBC 4.8 K, HGB 16.7 g, HCT 52.3 %,  K, ANC 2.6  12/6/23: Patient presents for follow up  Patient compliant with Eliquis 2.5 mg Patient had sleeping test performed, he does not have ARAM Denies bruising, epistaxis, hematuria  11/30/23 CBC: WBC 7.11, Hg 17.5, HCT 52.4, plt 265  6/5/24: Pt presents for follow up Pt reports no acute changes Currently receives Rituximab once every 6 months Currently still not able to get up, uses wheelchair Denies bleeding or bruising Denies being on testosterone, had sleep study recently Pt reports that he smokes around 0.5 ppd Used to donate blood regularly, around once per year before being diagnosed with MS Denies FMHx of hemochromatosis  11/27/24: Patient presents for follow up visit Continues of Eliquis 2.5 mg BID Follows with NEURO routinely, no acute changes, gets Rituximab, next at end of Dec 2024 Planning on moving mid 2025 to [place]  6/11/24 JAK2, MPL, CALR mutations negative Fe 65, TIBC 331, Sat 20, Ferritin 120, B12 417, FA 13.0 WBC 5.01, HGB 17.0, HCT 51.4, , ANC 3.29 .

## 2025-05-08 NOTE — DATA REVIEWED
[FreeTextEntry1] : XAM: [id number] - MR CHEST WAW IC  - ORDERED BY: TIMOTEO RAMIREZ   PROCEDURE DATE:  03/21/2025    INTERPRETATION:  Clinical indication: T6 paraplegia. Worsening mass in the right flank spreading to the abdomen.  Multiplanar multisequence MRI of the chest was performed with and without intravenous contrast localized to the area of concern.  9.5 cc of Gadavist was administered intravenously. 0.5 cc was discarded.  FINDINGS:  At approximately the right posterior lateral ninth rib there is a rounded area of heterogeneous hyperintense STIR signal and heterogeneous hyperintense T1 signal centered within the latissimus dorsi/serratus posterior musculature. There is also involvement of the subjacent intercostal muscles. Postcontrast imaging demonstrates nonenhancement of this region with surrounding peripheral enhancement. This area of nonenhancement measures up to 3.4 x 4.3 x 7.2 cm. There is prominent surrounding soft tissue edema. Intramuscular edema extends distally along the latissimus dorsi muscle. Findings may be related to underlying partial thickness muscle tear with associated hematoma. Correlate with clinical presentation to exclude any possibility of infection.  Signal arising from bone is within normal limits without evidence of acute fracture.  IMPRESSION:  7.2 cm heterogeneous hyperintense STIR and T1 peripherally enhancing collection centered about the right posterior lateral latissimus dorsi and serratus posterior muscles at approximately the level of the ninth rib. This also involves the subjacent intercostal musculature. Intramuscular edema extends distally along the latissimus dorsi muscle. Findings may be related to underlying partial thickness muscle tear with associated hematoma. Correlate with clinical presentation to exclude any possibility of infection. Follow-up MRI imaging to resolution is recommended.  --- End of Report ---

## 2025-05-08 NOTE — HISTORY OF PRESENT ILLNESS
[FreeTextEntry1] : Patient is a 39M smoker with history of bilateral paraplegia below level T6 2/2 transverse myelitis and presents today referred by Dr. Stephenson for back wound/mass.  He says he noticed the mass in mid March.  No antecedent trauma.  Progressively worsened.  He is insensate in the area.  Went to [facility] and had imaging, told he had a hematoma and was discharged home.  Returned to hospital for worsening swelling and skin wounding.  Underwent bedside (3-4cm) I&D with packing.  No fluid drainage.  Was discharged on antibiotics, no cultures noted.  Wound progressively worsened, has seen Dr. Stephenson and was referred to me for assistance.  Currently smokes 1/2ppd.  Wheelchair dependent.  Here today with wife.  Hx of DVT/PE on Eliquis.

## 2025-05-08 NOTE — PLAN
[TextEntry] : Patient presents with what seems like an intra/submuscular (latissmius) hematoma on the right side s/p bedside I&D without resolution, developing into chronic wound.  I told the patient I am able to assist with closure or coverage of the wound, which while be much larger and down to the rib cage after debridement.  Cannot ruleout osteomyelitis  with MRI, may need bone biopsy of the rib and biopsy of this mass.  If it extends intra-peritoneal or intrathoracic, then general surgery and/or CT surgery would need to perform debridement.  Options are complex closure, local tissue rearrangement, flap or grafting.  Would delay a graft or flap until the patient quits smoking for 4 weeks.  Will reach out to Dr. Stephenson

## 2025-05-08 NOTE — PHYSICAL EXAM
[TextEntry] : Physical Exam  General: No acute distress, well-appearing Neuro: Alert and oriented x3, bialteral lower extremity paraplegia, no sensation below nipples Psych: Appropriate mood and affect HEENT: Normocephalic, atraumatic Respiratory: Breathing comfortably on room air, normal effort and expansion Cardio: Regular rate by radial pulse, no cyanosis  Extremities: Warm well-perfused  Back Right mid back there is dark soft eschar with separatio nfrom the skin, yellow exudate and granulation tissue around the eschar.  No foul odor or significant drainage.  The area above thewound is firm, mass-like, no fluctuance.  No tendernes, insensate here.  Extensive tatoos.

## 2025-05-20 NOTE — PLAN
[TextEntry] : Patient presents with what seems like an intra/submuscular (latissmius) hematoma on the right side s/p bedside I&D without resolution, developing into chronic wound.  Status post repeat debridement in OR at [place] on 5/17/2025.  No cultures or biopsies performed at that time.  I discussed again with the patient and his wife today that this wound is now a chronic wound and may be amenable to primary closure only if the entire wound can be excised and closed which require some undermining.  I would also plan to take culture and biopsy at that time.  I do not see any rib exposed and do not think there is any osteo but if there is any concern during surgery I will take a bone biopsy as well.  This may be amenable to primary closure, otherwise would need a wound VAC and potentially skin graft or flap reconstruction in a delayed fashion.  He has not smoking since I had seen him however would need to have him off all nicotine for at least.

## 2025-05-20 NOTE — HISTORY OF PRESENT ILLNESS
[FreeTextEntry1] : Patient is a 39M smoker with history of bilateral paraplegia below level T6 2/2 transverse myelitis and presents today referred by Dr. Stephenson for back wound/mass.  He says he noticed the mass in mid March.  No antecedent trauma.  Progressively worsened.  He is insensate in the area.  Went to [facility] and had imaging, told he had a hematoma and was discharged home.  Returned to hospital for worsening swelling and skin wounding.  Underwent bedside (3-4cm) I&D with packing.  No fluid drainage.  Was discharged on antibiotics, no cultures noted.  Wound progressively worsened, has seen Dr. Stephenson and was referred to me for assistance.  Currently smokes 1/2ppd.  Wheelchair dependent.  Here today with wife.  Hx of DVT/PE on Eliquis.  Clinic 5/20/2025: Since last he was seen he presented to [place] on Saturday with fevers.  Fever workup was negative but he was taken the operating room by Dr. Stephenson for excisional debridement of the eschar.  It was left open with packing and partial closure.  Since that time he says several sutures was popped and the wound is open more.  He has had packing in place that was slowly withdrawn over the several days but otherwise is not sure how to care for the wound.  He has not seen Dr. Stephenson back postoperatively.  He says no biopsies or cultures were taken.  Currently on antibiotics.

## 2025-05-20 NOTE — DATA REVIEWED
[FreeTextEntry1] : XAM: [id number] - MR CHEST WAW IC  - ORDERED BY: TIMOTEO RAMIREZ   PROCEDURE DATE:  03/21/2025    INTERPRETATION:  Clinical indication: T6 paraplegia. Worsening mass in the right flank spreading to the abdomen.  Multiplanar multisequence MRI of the chest was performed with and without intravenous contrast localized to the area of concern.  9.5 cc of Gadavist was administered intravenously. 0.5 cc was discarded.  FINDINGS:  At approximately the right posterior lateral ninth rib there is a rounded area of heterogeneous hyperintense STIR signal and heterogeneous hyperintense T1 signal centered within the latissimus dorsi/serratus posterior musculature. There is also involvement of the subjacent intercostal muscles. Postcontrast imaging demonstrates nonenhancement of this region with surrounding peripheral enhancement. This area of nonenhancement measures up to 3.4 x 4.3 x 7.2 cm. There is prominent surrounding soft tissue edema. Intramuscular edema extends distally along the latissimus dorsi muscle. Findings may be related to underlying partial thickness muscle tear with associated hematoma. Correlate with clinical presentation to exclude any possibility of infection.  Signal arising from bone is within normal limits without evidence of acute fracture.  IMPRESSION:  7.2 cm heterogeneous hyperintense STIR and T1 peripherally enhancing collection centered about the right posterior lateral latissimus dorsi and serratus posterior muscles at approximately the level of the ninth rib. This also involves the subjacent intercostal musculature. Intramuscular edema extends distally along the latissimus dorsi muscle. Findings may be related to underlying partial thickness muscle tear with associated hematoma. Correlate with clinical presentation to exclude any possibility of infection. Follow-up MRI imaging to resolution is recommended.  --- End of Report ---

## 2025-05-20 NOTE — PHYSICAL EXAM
[TextEntry] : Physical Exam  General: No acute distress, well-appearing Neuro: Alert and oriented x3, bialteral lower extremity paraplegia, no sensation below nipples Psych: Appropriate mood and affect HEENT: Normocephalic, atraumatic Respiratory: Breathing comfortably on room air, normal effort and expansion Cardio: Regular rate by radial pulse, no cyanosis  Extremities: Warm well-perfused  Back Right mid back there Is a transverse incision that is partially closed on either side with Prolene's and the eschar is now gone with pink granulation tissue in the base of the wound with undermining from the 6 to 3 o'clock position superiorly there is a palpable mass that is firm and fixed.  No foul odor, Serous drainage.  .  No tendernes, insensate here.  Extensive tatoos.

## 2025-06-11 NOTE — HISTORY OF PRESENT ILLNESS
[FreeTextEntry1] : Patient is a 39M smoker with history of bilateral paraplegia below level T6 2/2 transverse myelitis and presents today referred by Dr. Stephenson for back wound/mass.  He says he noticed the mass in mid March.  No antecedent trauma.  Progressively worsened.  He is insensate in the area.  Went to [facility] and had imaging, told he had a hematoma and was discharged home.  Returned to hospital for worsening swelling and skin wounding.  Underwent bedside (3-4cm) I&D with packing.  No fluid drainage.  Was discharged on antibiotics, no cultures noted.  Wound progressively worsened, has seen Dr. Stephenson and was referred to me for assistance.  Currently smokes 1/2ppd.  Wheelchair dependent.  Here today with wife.  Hx of DVT/PE on Eliquis.  Clinic 5/20/2025: Since last he was seen he presented to [place] on Saturday with fevers.  Fever workup was negative but he was taken the operating room by Dr. Stephenson for excisional debridement of the eschar.  It was left open with packing and partial closure.  Since that time he says several sutures was popped and the wound is open more.  He has had packing in place that was slowly withdrawn over the several days but otherwise is not sure how to care for the wound.  He has not seen Dr. Stephenson back postoperatively.  He says no biopsies or cultures were taken.  Currently on antibiotics.  Clinic 6/11/2025: Patient underwent excision of the chronic ulcer of the right back with bone biopsy of the right lower rib and complex closure at [facility] on 6/2/2025.  He was discharged on postop day 1 started his Eliquis and call complaining of swelling and bleeding from the drain site.  He was admitted and/[facility] for right back hematoma underwent evacuation of the hematoma with complex closure over a drain on 6/4/2025.  He was discharged last Friday.  He returns today and feels well.  He has no pain or complaints.  The drain has been putting out 15 cc or less for the last 3 days and is completely serous.  He is still currently holding his Eliquis we will plan to see his hematologist to see if he can get off of it if possible.  Otherwise he has no complaints.

## 2025-06-11 NOTE — PHYSICAL EXAM
[TextEntry] : Back: Right back incision transverse oriented is clean dry intact with sutures and staples.  The area is soft and nontender.  There is serous fluid in the drain.  The drain was removed for low output which was well-tolerated.  After removal there was approximately 20 to 30 cc of serous fluid that drained from the hole and then taper down.  No evidence of bleeding or hematoma or acute infection.

## 2025-06-11 NOTE — PLAN
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  He was discharged on postop day 1 started his Eliquis and call complaining of swelling and bleeding from the drain site.  He was admitted and/[facility] for right back hematoma underwent evacuation of the hematoma with complex closure over a drain on 6/4/2025.  He was discharged last Friday 6/6/25  He is healing well today without evidence of recurrent hematoma formation.  The drain was removed for low output and still had some serous fluid in the wound that drained out.  Incisions holding well without evidence of acute infection.  He is currently on antibiotics for his culture positive Enterococcus from the wound however his bone biopsy was negative so I do not think he needs prolonged antibiotics.  The incision was dressed.  I advised him against strenuous activity or rigorous use of the latissimus muscle for at least the next 6 weeks until the muscle injury heals.  Return to clinic in 2 weeks for suture removal

## 2025-06-25 NOTE — PLAN
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  He was discharged on postop day 1 started his Eliquis and call complaining of swelling and bleeding from the drain site.  He was admitted and/[facility] for right back hematoma underwent evacuation of the hematoma with complex closure over a drain on 6/4/2025.  He was discharged Friday 6/6/25  Patient is well-healed today and all the sutures and staples removed.  Educated on scar care.  Activity restrictions for another 3 weeks.  Very small dogear anteriorly.  He can massage this to see if it improves.  Return to clinic in 2 months

## 2025-06-25 NOTE — HISTORY OF PRESENT ILLNESS
[FreeTextEntry1] : Patient is a 39M smoker with history of bilateral paraplegia below level T6 2/2 transverse myelitis and presents today referred by Dr. Stephenson for back wound/mass.  He says he noticed the mass in mid March.  No antecedent trauma.  Progressively worsened.  He is insensate in the area.  Went to [facility] and had imaging, told he had a hematoma and was discharged home.  Returned to hospital for worsening swelling and skin wounding.  Underwent bedside (3-4cm) I&D with packing.  No fluid drainage.  Was discharged on antibiotics, no cultures noted.  Wound progressively worsened, has seen Dr. Stephenson and was referred to me for assistance.  Currently smokes 1/2ppd.  Wheelchair dependent.  Here today with wife.  Hx of DVT/PE on Eliquis.  Clinic 5/20/2025: Since last he was seen he presented to [place] on Saturday with fevers.  Fever workup was negative but he was taken the operating room by Dr. Stephenson for excisional debridement of the eschar.  It was left open with packing and partial closure.  Since that time he says several sutures was popped and the wound is open more.  He has had packing in place that was slowly withdrawn over the several days but otherwise is not sure how to care for the wound.  He has not seen Dr. Stephenson back postoperatively.  He says no biopsies or cultures were taken.  Currently on antibiotics.  Clinic 6/11/2025: Patient underwent excision of the chronic ulcer of the right back with bone biopsy of the right lower rib and complex closure at [facility] on 6/2/2025.  He was discharged on postop day 1 started his Eliquis and call complaining of swelling and bleeding from the drain site.  He was admitted and/[facility] for right back hematoma underwent evacuation of the hematoma with complex closure over a drain on 6/4/2025.  He was discharged last Friday.  He returns today and feels well.  He has no pain or complaints.  The drain has been putting out 15 cc or less for the last 3 days and is completely serous.  He is still currently holding his Eliquis we will plan to see his hematologist to see if he can get off of it if possible.  Otherwise he has no complaints.  Clinic 6/25/2025: Patient is doing well today.  He has no pain and no new complaints.

## 2025-06-25 NOTE — PHYSICAL EXAM
[TextEntry] : Back: Right back incision transverse oriented is Is well-healed, the area is soft and nontender.  No evidence of a fluid collection or infection.  All the sutures and staples were removed without any issues.  Small dogear anteriorly

## 2025-07-15 NOTE — PHYSICAL EXAM
[Normal] : Oriented to person, place, and time, insight and judgement were intact and the affect was normal [de-identified] : No asymmetry of hips. Sitting position symmetric.  [de-identified] : Incision right lateral trunk fully healed.  [de-identified] : stable, motor complete, paraplegia. Spasticity present.

## 2025-07-15 NOTE — HISTORY OF PRESENT ILLNESS
[FreeTextEntry1] : 39M w/ PMH likely MS (on Truxima) c/b T6 paraplegia, DVT/PE, polycythemia, tobacco use, neurogenic bladder (on Solifenacin) presenting for follow up. Patient is s/p excision of ulcer/hematoma of the right back with bone biopsy of the right lower rib and complex closure at [facility] on 6/2/2025. He was discharged on postop day 1, restarted his Eliquis, and developed right back hematoma. Now s/p evacuation of the hematoma with complex closure over a drain on 6/4/2025 and discharged 6/6/25. Wound has healed well, sutures/staples removed on 6/25/25. Patient reports he has not resumed his Eliquis (for hx of DVT/PE) since the surgery. Was reportedly cleared by surgery to resume but would like to discuss with his hematologist before doing so. Has an appointment in two weeks to discuss, though current hematologist recommended long term use.   Patient had contacted office on 7/8/25 due to cloudy urine and urge incontinence which did not resolve with a few days of flushing. Urine cx showed Klebsiella pneumoniae and patient currently on Levaquin. Reports symptoms have resolved. Otherwise, bladder care is going well. Denies any accidents between catheterizations now. Continues to take Solifenacin. He is doing well with QOD AM bowel routine. Using suppository w/ digital stim PRN. Was fitted for new wheelchair which is due to be delivered at the end of July. Has not yet returned to exercise s/p surgery, awaiting clearance.

## 2025-07-15 NOTE — REVIEW OF SYSTEMS
[Chest Pain] : no chest pain [Palpitations] : no palpitations [Joint Pain] : no joint pain [Negative] : Psychiatric [FreeTextEntry2] : Has lost considerable weight, desired.  [FreeTextEntry5] : Noticed in chart that reading on EKG was "abnormal" w NSSTchanges [FreeTextEntry7] : see HPI [FreeTextEntry8] : see HPI [de-identified] : see HPI [de-identified] : see HPI [de-identified] : see HPI

## 2025-07-15 NOTE — END OF VISIT
[] : Resident [FreeTextEntry3] : I was physically present for key portion of the history and physical exam and I directed medical management as documented by Dr. ROHAN Powell.

## 2025-07-15 NOTE — ASSESSMENT
[FreeTextEntry1] : 39M w/ PMH MS (on Truxima) c/b T6 paraplegia, DVT/PE, polycythemia, tobacco use, neurogenic bladder (on Solifenacin).  Rec: - Complete course of Levaquin for Klebsiella UTI. Symptoms have improved. - No objection to d/c methenamine.  - Counseled on smoking cessation. Patient currently smoking 6 cigarettes per day but agreeable to attempt to cut down. He is really at precontemplative stage.  - Referral for preventative cardiology - Patient would like to return to therapy to work on wheelchair skills when obtains new wheelchair. He will contact me. - Next Renal/Bladder US due January 2026 - Next DEXA due December 2026 - Patient requesting handicap pass paperwork to be filled out but does not have it with him today. Advised to drop off to office when able - Follow up at scheduled hematology appt to discuss whether he should resume Eliquis